# Patient Record
Sex: MALE | Race: WHITE | URBAN - METROPOLITAN AREA
[De-identification: names, ages, dates, MRNs, and addresses within clinical notes are randomized per-mention and may not be internally consistent; named-entity substitution may affect disease eponyms.]

---

## 2017-06-27 ENCOUNTER — IMPORTED ENCOUNTER (OUTPATIENT)
Dept: URBAN - METROPOLITAN AREA CLINIC 50 | Facility: CLINIC | Age: 77
End: 2017-06-27

## 2017-06-28 ENCOUNTER — IMPORTED ENCOUNTER (OUTPATIENT)
Dept: URBAN - METROPOLITAN AREA CLINIC 50 | Facility: CLINIC | Age: 77
End: 2017-06-28

## 2018-04-02 NOTE — PATIENT DISCUSSION
Pt stated he is leaving 6 days to return [place] for the summer.  Advised to schedule an appt in [address] in [address] [place] [address].

## 2018-12-11 ENCOUNTER — IMPORTED ENCOUNTER (OUTPATIENT)
Dept: URBAN - METROPOLITAN AREA CLINIC 50 | Facility: CLINIC | Age: 78
End: 2018-12-11

## 2020-01-22 ENCOUNTER — IMPORTED ENCOUNTER (OUTPATIENT)
Dept: URBAN - METROPOLITAN AREA CLINIC 50 | Facility: CLINIC | Age: 80
End: 2020-01-22

## 2021-04-18 ASSESSMENT — VISUAL ACUITY
OS_CC: J3
OD_CC: J3
OS_CC: J1+
OD_OTHER: 20/40. 20/50.
OD_BAT: 20/30
OS_BAT: 20/100
OS_BAT: 20/30
OD_OTHER: >20/400.
OD_SC: 20/25-
OS_OTHER: 20/30. 20/50.
OS_BAT: 20/50
OS_CC: J1
OS_PH: 20/25
OD_CC: J1
OS_SC: 20/30+
OS_OTHER: 20/50. 20/50.
OD_SC: 20/30
OD_PH: 20/25
OD_BAT: 20/40
OD_SC: 20/30+
OS_OTHER: 20/100. 20/400.
OS_SC: 20/30
OD_CC: J1+
OD_BAT: >20/400
OS_SC: 20/30
OD_OTHER: 20/30. 20/50.

## 2021-04-18 ASSESSMENT — TONOMETRY
OS_IOP_MMHG: 12
OD_IOP_MMHG: 12
OS_IOP_MMHG: 12
OS_IOP_MMHG: 12
OD_IOP_MMHG: 13
OD_IOP_MMHG: 14

## 2021-05-26 ENCOUNTER — PREPPED CHART (OUTPATIENT)
Dept: URBAN - METROPOLITAN AREA CLINIC 53 | Facility: CLINIC | Age: 81
End: 2021-05-26

## 2021-05-26 NOTE — PATIENT DISCUSSION
"""Follow dry ARMD without treatment. [facility]. Patient to call if vision changes or distortion increases. Good diet/do not smoke. ""."

## 2021-06-01 ENCOUNTER — COMPREHENSIVE EXAM (OUTPATIENT)
Dept: URBAN - METROPOLITAN AREA CLINIC 52 | Facility: CLINIC | Age: 81
End: 2021-06-01

## 2021-06-01 DIAGNOSIS — H26.493: ICD-10-CM

## 2021-06-01 DIAGNOSIS — H35.372: ICD-10-CM

## 2021-06-01 DIAGNOSIS — H43.811: ICD-10-CM

## 2021-06-01 DIAGNOSIS — H35.3131: ICD-10-CM

## 2021-06-01 PROCEDURE — 92134 CPTRZ OPH DX IMG PST SGM RTA: CPT

## 2021-06-01 PROCEDURE — 92014 COMPRE OPH EXAM EST PT 1/>: CPT

## 2021-06-01 ASSESSMENT — VISUAL ACUITY
OD_SC: 20/30+2
OS_SC: 20/25-3
OS_GLARE: 20/40-2
OD_GLARE: 20/30-2
OS_GLARE: 20/50-1
OU_CC: J1
OD_GLARE: 20/50+2

## 2021-06-01 ASSESSMENT — TONOMETRY
OD_IOP_MMHG: 11
OS_IOP_MMHG: 10

## 2022-10-26 ENCOUNTER — ESTABLISHED PATIENT (OUTPATIENT)
Dept: URBAN - METROPOLITAN AREA CLINIC 53 | Facility: CLINIC | Age: 82
End: 2022-10-26

## 2022-10-26 DIAGNOSIS — H35.372: ICD-10-CM

## 2022-10-26 DIAGNOSIS — H43.813: ICD-10-CM

## 2022-10-26 DIAGNOSIS — H26.493: ICD-10-CM

## 2022-10-26 DIAGNOSIS — H35.3131: ICD-10-CM

## 2022-10-26 PROCEDURE — 92134 CPTRZ OPH DX IMG PST SGM RTA: CPT

## 2022-10-26 PROCEDURE — 66821 AFTER CATARACT LASER SURGERY: CPT

## 2022-10-26 PROCEDURE — 92014 COMPRE OPH EXAM EST PT 1/>: CPT

## 2022-10-26 ASSESSMENT — VISUAL ACUITY
OU_SC: J1
OS_PH: 20/25-2
OD_GLARE: 20/30-1
OS_SC: 20/50+2
OD_PH: 20/25-1
OS_GLARE: 20/30
OS_GLARE: 20/30
OD_SC: 20/30+2
OD_GLARE: 20/30-1

## 2022-10-26 ASSESSMENT — TONOMETRY
OD_IOP_MMHG: 10
OS_IOP_MMHG: 09

## 2022-10-26 NOTE — PROCEDURE NOTE: CLINICAL
PROCEDURE NOTE: YAG Capsulotomy OS. Diagnosis: Posterior Capsular Opacification (PCO). Prep: Mydriacil 1% and Phenylephrine 2.5%. Prior to treatment, the risks/benefits/alternatives were discussed. The patient wished to proceed with procedure. Consent was signed. Proparacaine and brominidine were placed into the operative eye after the eye was dilated. Power = 16mJ. Number of pulses = 1.6. Patient tolerated procedure well and there were no complications. Post Laser instructions given. Sherine Coates

## 2022-11-23 ENCOUNTER — POST-OP (OUTPATIENT)
Dept: URBAN - METROPOLITAN AREA CLINIC 53 | Facility: CLINIC | Age: 82
End: 2022-11-23

## 2022-11-23 DIAGNOSIS — H35.3131: ICD-10-CM

## 2022-11-23 DIAGNOSIS — H43.813: ICD-10-CM

## 2022-11-23 DIAGNOSIS — H26.492: ICD-10-CM

## 2022-11-23 DIAGNOSIS — Z98.890: ICD-10-CM

## 2022-11-23 DIAGNOSIS — H35.372: ICD-10-CM

## 2022-11-23 PROCEDURE — 99024 POSTOP FOLLOW-UP VISIT: CPT

## 2022-11-23 ASSESSMENT — VISUAL ACUITY
OD_GLARE: 20/25
OS_SC: 20/30
OD_SC: 20/30
OD_GLARE: 20/25
OU_SC: J2
OU_SC: 20/30

## 2022-11-23 ASSESSMENT — TONOMETRY
OD_IOP_MMHG: 13
OS_IOP_MMHG: 13

## 2022-11-23 NOTE — PATIENT DISCUSSION
PCO ([id number]): Visually significant PCO present on exam today. Recommend YAG laser capsulotomy to improve vision and decrease glare symptoms. RBAs of procedure discussed. Patient agrees and wishes to proceed.

## 2023-11-29 ENCOUNTER — COMPREHENSIVE EXAM (OUTPATIENT)
Dept: URBAN - METROPOLITAN AREA CLINIC 53 | Facility: CLINIC | Age: 83
End: 2023-11-29

## 2023-11-29 DIAGNOSIS — H18.413: ICD-10-CM

## 2023-11-29 DIAGNOSIS — H35.3131: ICD-10-CM

## 2023-11-29 DIAGNOSIS — H43.813: ICD-10-CM

## 2023-11-29 DIAGNOSIS — H26.491: ICD-10-CM

## 2023-11-29 DIAGNOSIS — H35.372: ICD-10-CM

## 2023-11-29 PROCEDURE — 92014 COMPRE OPH EXAM EST PT 1/>: CPT

## 2023-11-29 PROCEDURE — 92134 CPTRZ OPH DX IMG PST SGM RTA: CPT

## 2023-11-29 ASSESSMENT — VISUAL ACUITY
OD_SC: 20/30
OS_SC: 20/40-2
OD_PH: 20/25-2
OU_SC: J3@16"
OD_GLARE: 20/30
OD_GLARE: 20/25-1

## 2023-11-29 ASSESSMENT — TONOMETRY
OD_IOP_MMHG: 14
OS_IOP_MMHG: 14

## 2024-09-17 ENCOUNTER — APPOINTMENT (RX ONLY)
Dept: URBAN - METROPOLITAN AREA CLINIC 57 | Facility: CLINIC | Age: 84
Setting detail: DERMATOLOGY
End: 2024-09-17

## 2024-09-17 DIAGNOSIS — L20.89 OTHER ATOPIC DERMATITIS: ICD-10-CM | Status: INADEQUATELY CONTROLLED

## 2024-09-17 PROCEDURE — ? PRESCRIPTION MEDICATION MANAGEMENT

## 2024-09-17 PROCEDURE — ? PRESCRIPTION

## 2024-09-17 PROCEDURE — ? COUNSELING

## 2024-09-17 PROCEDURE — 99204 OFFICE O/P NEW MOD 45 MIN: CPT

## 2024-09-17 PROCEDURE — ? MDM - TREATMENT GOALS

## 2024-09-17 RX ORDER — CLOBETASOL PROPIONATE 0.5 MG/G
CREAM TOPICAL BID
Qty: 60 | Refills: 3 | Status: ERX | COMMUNITY
Start: 2024-09-17

## 2024-09-17 RX ADMIN — CLOBETASOL PROPIONATE: 0.5 CREAM TOPICAL at 00:00

## 2024-09-17 ASSESSMENT — LOCATION SIMPLE DESCRIPTION DERM
LOCATION SIMPLE: LEFT UPPER BACK
LOCATION SIMPLE: CHEST

## 2024-09-17 ASSESSMENT — LOCATION DETAILED DESCRIPTION DERM
LOCATION DETAILED: LEFT MEDIAL SUPERIOR CHEST
LOCATION DETAILED: LEFT MID-UPPER BACK

## 2024-09-17 ASSESSMENT — LOCATION ZONE DERM: LOCATION ZONE: TRUNK

## 2024-09-17 NOTE — PROCEDURE: PRESCRIPTION MEDICATION MANAGEMENT
Plan: Recommended Hypoallergenic regimen - All Free and Clear detergent, no fabric softeners, no dryer sheets, Dove for sensitive skin soap, gentle/frequent moisturizers like CeraVe, Sarna lotion or Dermeleve cream (refrigerated for cooling effect) prn for itch. \\n\\nConsider Dupixent in future if no improvement.
Initiate Treatment: clobetasol 0.05 % topical cream BID: Apply twice daily to affected areas on body twice daily as needed, 2 weeks on, one week off, repeat cycle as needed. Do not use on face or skin folds.
Detail Level: Simple
Render In Strict Bullet Format?: No

## 2024-10-29 ENCOUNTER — APPOINTMENT (RX ONLY)
Dept: URBAN - METROPOLITAN AREA CLINIC 57 | Facility: CLINIC | Age: 84
Setting detail: DERMATOLOGY
End: 2024-10-29

## 2024-10-29 DIAGNOSIS — L20.89 OTHER ATOPIC DERMATITIS: ICD-10-CM

## 2024-10-29 PROCEDURE — ? PRESCRIPTION

## 2024-10-29 PROCEDURE — ? COUNSELING

## 2024-10-29 PROCEDURE — 99214 OFFICE O/P EST MOD 30 MIN: CPT

## 2024-10-29 PROCEDURE — ? MDM - TREATMENT GOALS

## 2024-10-29 PROCEDURE — ? ADDITIONAL NOTES

## 2024-10-29 PROCEDURE — ? PRESCRIPTION MEDICATION MANAGEMENT

## 2024-10-29 RX ORDER — PHARMACY COMPOUNDING ACCESSORY
EACH MISCELLANEOUS
Qty: 30 | Refills: 2 | Status: ERX | COMMUNITY
Start: 2024-10-29

## 2024-10-29 RX ADMIN — Medication: at 00:00

## 2024-10-29 ASSESSMENT — LOCATION SIMPLE DESCRIPTION DERM
LOCATION SIMPLE: LEFT UPPER BACK
LOCATION SIMPLE: CHEST

## 2024-10-29 ASSESSMENT — LOCATION DETAILED DESCRIPTION DERM
LOCATION DETAILED: LEFT MEDIAL SUPERIOR CHEST
LOCATION DETAILED: LEFT MID-UPPER BACK

## 2024-10-29 ASSESSMENT — LOCATION ZONE DERM: LOCATION ZONE: TRUNK

## 2024-10-29 NOTE — PROCEDURE: PRESCRIPTION MEDICATION MANAGEMENT
Plan: Recommended Hypoallergenic regimen - All Free and Clear detergent, no fabric softeners, no dryer sheets, Dove for sensitive skin soap, gentle/frequent moisturizers like CeraVe, Sarna lotion or Dermeleve cream (refrigerated for cooling effect) prn for itch. \\n\\nConsider Dupixent in future if no improvement.
Initiate Treatment: Naltrexone 3 mg - take 1 capsule PO QD
Detail Level: Simple
Continue Regimen: clobetasol 0.05 % topical cream BID: Apply twice daily to affected areas on body twice daily as needed, 2 weeks on, one week off, repeat cycle as needed. Do not use on face or skin folds.
Render In Strict Bullet Format?: No

## 2024-10-29 NOTE — PROCEDURE: ADDITIONAL NOTES
Render Risk Assessment In Note?: no
Detail Level: Simple
Additional Notes: Likely uremic pruritus 2/2 renal insufficiency - pt sees nephrologist

## 2024-12-02 ENCOUNTER — COMPREHENSIVE EXAM (OUTPATIENT)
Age: 84
End: 2024-12-02

## 2024-12-02 DIAGNOSIS — H35.3131: ICD-10-CM

## 2024-12-02 DIAGNOSIS — H43.813: ICD-10-CM

## 2024-12-02 DIAGNOSIS — H35.372: ICD-10-CM

## 2024-12-02 PROCEDURE — 99214 OFFICE O/P EST MOD 30 MIN: CPT

## 2024-12-02 PROCEDURE — 92134 CPTRZ OPH DX IMG PST SGM RTA: CPT

## 2024-12-10 ENCOUNTER — APPOINTMENT (OUTPATIENT)
Dept: URBAN - METROPOLITAN AREA CLINIC 57 | Facility: CLINIC | Age: 84
Setting detail: DERMATOLOGY
End: 2024-12-10

## 2024-12-10 DIAGNOSIS — L20.89 OTHER ATOPIC DERMATITIS: ICD-10-CM

## 2024-12-10 PROCEDURE — G2211 COMPLEX E/M VISIT ADD ON: HCPCS

## 2024-12-10 PROCEDURE — ? VISIT COMPLEXITY

## 2024-12-10 PROCEDURE — ? PRESCRIPTION MEDICATION MANAGEMENT

## 2024-12-10 PROCEDURE — ? COUNSELING

## 2024-12-10 PROCEDURE — ? MDM - TREATMENT GOALS

## 2024-12-10 PROCEDURE — 99214 OFFICE O/P EST MOD 30 MIN: CPT

## 2024-12-10 PROCEDURE — ? ADDITIONAL NOTES

## 2024-12-10 PROCEDURE — ? DUPIXENT INITIATION

## 2024-12-10 PROCEDURE — ? PRESCRIPTION

## 2024-12-10 PROCEDURE — ? PATIENT SPECIFIC COUNSELING

## 2024-12-10 RX ORDER — DUPILUMAB 300 MG/2ML
1 INJECTION, SOLUTION SUBCUTANEOUS
Qty: 2 | Refills: 11 | Status: ERX | COMMUNITY
Start: 2024-12-10

## 2024-12-10 RX ADMIN — DUPILUMAB 1: 300 INJECTION, SOLUTION SUBCUTANEOUS at 00:00

## 2024-12-10 ASSESSMENT — LOCATION SIMPLE DESCRIPTION DERM: LOCATION SIMPLE: LEFT UPPER BACK

## 2024-12-10 ASSESSMENT — SEVERITY ASSESSMENT 2020: SEVERITY 2020: MODERATE

## 2024-12-10 ASSESSMENT — ITCH NUMERIC RATING SCALE: HOW SEVERE IS YOUR ITCHING?: 8

## 2024-12-10 ASSESSMENT — LOCATION DETAILED DESCRIPTION DERM: LOCATION DETAILED: LEFT MID-UPPER BACK

## 2024-12-10 ASSESSMENT — LOCATION ZONE DERM: LOCATION ZONE: TRUNK

## 2024-12-10 NOTE — PROCEDURE: DUPIXENT INITIATION
Pregnancy And Lactation Warning Text: There have not been adverse fetal risks in women taking Dupixent while pregnant. It is unknown if this medication is excreted in breast milk.
Detail Level: Zone
Is Thalidomide Contraindicated?: No
Is Cyclosporine Contraindicated?: Yes
Dupixent Monitoring Guidelines: There is no laboratory monitoring requirement with Dupixent.
Dupixent Dosing: 600 mg SC day 0 then 300 mg SC every other week
Diagnosis (Required): Atopic Dermatitis/Eczematous Dermatitis

## 2024-12-10 NOTE — PROCEDURE: PRESCRIPTION MEDICATION MANAGEMENT
Plan: Recommended Hypoallergenic regimen - All Free and Clear detergent, no fabric softeners, no dryer sheets, Dove for sensitive skin soap, gentle/frequent moisturizers like CeraVe, Sarna lotion or Dermeleve cream (refrigerated for cooling effect) prn for itch.
Detail Level: Simple
Continue Regimen: clobetasol 0.05 % topical cream BID: Apply twice daily to affected areas on body twice daily as needed, 2 weeks on, one week off, repeat cycle as needed. Do not use on face or skin folds.
Discontinue Regimen: Naltrexone 3 mg - take 1 capsule PO QD
Render In Strict Bullet Format?: No

## 2024-12-11 RX ORDER — DUPILUMAB 300 MG/2ML
1 INJECTION, SOLUTION SUBCUTANEOUS
Qty: 2 | Refills: 11 | Status: ERX

## 2025-01-02 ENCOUNTER — APPOINTMENT (OUTPATIENT)
Dept: URBAN - METROPOLITAN AREA CLINIC 57 | Facility: CLINIC | Age: 85
Setting detail: DERMATOLOGY
End: 2025-01-02

## 2025-01-02 DIAGNOSIS — L20.89 OTHER ATOPIC DERMATITIS: ICD-10-CM | Status: IMPROVED

## 2025-01-02 PROCEDURE — ? DUPIXENT MONITORING

## 2025-01-02 PROCEDURE — ? PATIENT SPECIFIC COUNSELING

## 2025-01-02 PROCEDURE — ? DUPIXENT INJECTION

## 2025-01-02 PROCEDURE — ? ADDITIONAL NOTES

## 2025-01-02 PROCEDURE — ? MDM - TREATMENT GOALS

## 2025-01-02 PROCEDURE — ? PRESCRIPTION MEDICATION MANAGEMENT

## 2025-01-02 PROCEDURE — 96372 THER/PROPH/DIAG INJ SC/IM: CPT

## 2025-01-02 PROCEDURE — ? COUNSELING

## 2025-01-02 ASSESSMENT — LOCATION ZONE DERM: LOCATION ZONE: TRUNK

## 2025-01-02 ASSESSMENT — LOCATION SIMPLE DESCRIPTION DERM
LOCATION SIMPLE: ABDOMEN
LOCATION SIMPLE: LEFT UPPER BACK

## 2025-01-02 ASSESSMENT — LOCATION DETAILED DESCRIPTION DERM
LOCATION DETAILED: PERIUMBILICAL SKIN
LOCATION DETAILED: LEFT MID-UPPER BACK

## 2025-01-02 NOTE — PROCEDURE: PRESCRIPTION MEDICATION MANAGEMENT
Plan: Recommended Hypoallergenic regimen - All Free and Clear detergent, no fabric softeners, no dryer sheets, Dove for sensitive skin soap, gentle/frequent moisturizers like CeraVe, Sarna lotion or Dermeleve cream (refrigerated for cooling effect) prn for itch.
Detail Level: Simple
Continue Regimen: Dupixent 300 mg/ 2 mL pen- inject subcutaneously every 2 weeks. \\n\\nclobetasol 0.05 % topical cream BID: Apply twice daily to affected areas on body twice daily as needed, 2 weeks on, one week off, repeat cycle as needed. Do not use on face or skin folds.
Render In Strict Bullet Format?: No

## 2025-01-02 NOTE — PROCEDURE: ADDITIONAL NOTES
Render Risk Assessment In Note?: no
Detail Level: Simple
Additional Notes: Likely uremic pruritus 2/2 renal insufficiency - pt sees nephrologist\\n\\n1/2- dupixent PA was approved, patient has not received from [facility] yet. Provided patient with contact information and recommend he follow up with pharmacy.

## 2025-01-02 NOTE — PROCEDURE: DUPIXENT INJECTION
Include J-Code In Bill: No
Use Enhanced Ndc?: Yes
Expiration Date (Optional): 5/31/26
[address] Billing Preferences: 1
Ndc (300 Mg Prefilled Syringe): 7856-8743-91
Ndc (300 Mg Prefilled Pen): 2922-3710-98
Administered By (Optional): Karla Gibbs MA
Dupixent Dosing: 300 mg
Ndc (200 Mg Prefilled Syringe): 2356-1552-10
Lot # (Optional): [id number]
Detail Level: None
Syringe Size Used (Required For Enhanced Ndc): 300 mg/2ml prefilled pen
Consent: The risks of pain and injection site reactions were reviewed with the patient prior to the injection.
J-Code: 
Ndc (200 Mg Prefilled Pen): 2198-2007-73
Date Of Next Injection: 2 Weeks

## 2025-04-30 ENCOUNTER — EMERGENCY VISIT (OUTPATIENT)
Age: 85
End: 2025-04-30

## 2025-04-30 DIAGNOSIS — H53.8: ICD-10-CM

## 2025-04-30 DIAGNOSIS — H35.3131: ICD-10-CM

## 2025-04-30 DIAGNOSIS — H35.372: ICD-10-CM

## 2025-04-30 PROCEDURE — 99214 OFFICE O/P EST MOD 30 MIN: CPT

## 2025-04-30 PROCEDURE — 92134 CPTRZ OPH DX IMG PST SGM RTA: CPT

## 2025-05-06 ENCOUNTER — RX ONLY (RX ONLY)
Age: 85
End: 2025-05-06

## 2025-05-06 RX ORDER — DUPILUMAB 300 MG/2ML
INJECTION, SOLUTION SUBCUTANEOUS
Qty: 4 | Refills: 11 | Status: ERX

## 2025-05-14 ENCOUNTER — APPOINTMENT (OUTPATIENT)
Age: 85
Setting detail: DERMATOLOGY
End: 2025-05-14

## 2025-05-14 DIAGNOSIS — L57.0 ACTINIC KERATOSIS: ICD-10-CM | Status: INADEQUATELY CONTROLLED

## 2025-05-14 DIAGNOSIS — L20.89 OTHER ATOPIC DERMATITIS: ICD-10-CM | Status: WELL CONTROLLED

## 2025-05-14 PROCEDURE — 99213 OFFICE O/P EST LOW 20 MIN: CPT | Mod: 25

## 2025-05-14 PROCEDURE — ? ADDITIONAL NOTES

## 2025-05-14 PROCEDURE — ? LIQUID NITROGEN

## 2025-05-14 PROCEDURE — 17004 DESTROY PREMAL LESIONS 15/>: CPT

## 2025-05-14 PROCEDURE — ? PATIENT SPECIFIC COUNSELING

## 2025-05-14 PROCEDURE — ? COUNSELING

## 2025-05-14 PROCEDURE — ? MDM - TREATMENT GOALS

## 2025-05-14 PROCEDURE — ? PRESCRIPTION MEDICATION MANAGEMENT

## 2025-05-14 PROCEDURE — ? DUPIXENT MONITORING

## 2025-05-14 ASSESSMENT — LOCATION DETAILED DESCRIPTION DERM
LOCATION DETAILED: RIGHT PROXIMAL RADIAL DORSAL FOREARM
LOCATION DETAILED: LEFT PROXIMAL RADIAL DORSAL FOREARM
LOCATION DETAILED: LEFT ULNAR DORSAL HAND
LOCATION DETAILED: RIGHT VENTRAL PROXIMAL FOREARM
LOCATION DETAILED: RIGHT LATERAL FOREHEAD
LOCATION DETAILED: LEFT MID-UPPER BACK
LOCATION DETAILED: RIGHT RADIAL DORSAL HAND
LOCATION DETAILED: RIGHT ANTECUBITAL SKIN
LOCATION DETAILED: LEFT ANTERIOR LATERAL DISTAL UPPER ARM
LOCATION DETAILED: LEFT RADIAL DORSAL HAND
LOCATION DETAILED: RIGHT ANTERIOR DISTAL UPPER ARM
LOCATION DETAILED: RIGHT VENTRAL DISTAL FOREARM
LOCATION DETAILED: LEFT PROXIMAL DORSAL FOREARM
LOCATION DETAILED: RIGHT DORSAL THUMB METACARPOPHALANGEAL JOINT
LOCATION DETAILED: LEFT DISTAL DORSAL FOREARM

## 2025-05-14 ASSESSMENT — LOCATION ZONE DERM
LOCATION ZONE: ARM
LOCATION ZONE: FACE
LOCATION ZONE: FINGER
LOCATION ZONE: HAND
LOCATION ZONE: TRUNK

## 2025-05-14 ASSESSMENT — LOCATION SIMPLE DESCRIPTION DERM
LOCATION SIMPLE: LEFT FOREARM
LOCATION SIMPLE: RIGHT ELBOW
LOCATION SIMPLE: RIGHT UPPER ARM
LOCATION SIMPLE: RIGHT THUMB
LOCATION SIMPLE: RIGHT FOREHEAD
LOCATION SIMPLE: RIGHT HAND
LOCATION SIMPLE: LEFT UPPER ARM
LOCATION SIMPLE: LEFT HAND
LOCATION SIMPLE: RIGHT FOREARM
LOCATION SIMPLE: LEFT UPPER BACK

## 2025-05-14 NOTE — PROCEDURE: ADDITIONAL NOTES
Render Risk Assessment In Note?: no
Detail Level: Simple
Additional Notes: Likely uremic pruritus 2/2 renal insufficiency - pt sees nephrologist\\n\\n1/2- dupixent PA was approved, patient has not received from [facility] yet. Provided patient with contact information and recommend he follow up with pharmacy.

## 2025-05-14 NOTE — PROCEDURE: LIQUID NITROGEN
Post-Care Instructions: I reviewed with the patient in detail post-care instructions. Patient is to wear sunprotection, and avoid picking at any of the treated lesions. Pt may apply Vaseline to crusted or scabbing areas.
Consent: The patient's consent was obtained including but not limited to risks of crusting, scabbing, blistering, scarring, darker or lighter pigmentary change, recurrence, incomplete removal and infection.
Duration Of Freeze Thaw-Cycle (Seconds): 2
Number Of Freeze-Thaw Cycles: 1 freeze-thaw cycle
Detail Level: Detailed
Show Aperture Variable?: Yes
Render Note In Bullet Format When Appropriate: No